# Patient Record
Sex: FEMALE | URBAN - METROPOLITAN AREA
[De-identification: names, ages, dates, MRNs, and addresses within clinical notes are randomized per-mention and may not be internally consistent; named-entity substitution may affect disease eponyms.]

---

## 2023-10-16 ENCOUNTER — APPOINTMENT (RX ONLY)
Dept: URBAN - METROPOLITAN AREA CLINIC 327 | Facility: CLINIC | Age: 43
Setting detail: DERMATOLOGY
End: 2023-10-16

## 2023-10-16 DIAGNOSIS — Z41.9 ENCOUNTER FOR PROCEDURE FOR PURPOSES OTHER THAN REMEDYING HEALTH STATE, UNSPECIFIED: ICD-10-CM

## 2023-10-16 DIAGNOSIS — H02.6 XANTHELASMA OF EYELID: ICD-10-CM

## 2023-10-16 PROBLEM — H02.64 XANTHELASMA OF LEFT UPPER EYELID: Status: ACTIVE | Noted: 2023-10-16

## 2023-10-16 PROBLEM — H02.60 XANTHELASMA OF UNSPECIFIED EYE, UNSPECIFIED EYELID: Status: ACTIVE | Noted: 2023-10-16

## 2023-10-16 PROCEDURE — ? ADDITIONAL NOTES

## 2023-10-16 PROCEDURE — ? SMARTXIDE DEKA DOT

## 2023-10-16 PROCEDURE — ? COSMETIC CONSULTATION: COOLPEEL

## 2023-10-16 ASSESSMENT — LOCATION DETAILED DESCRIPTION DERM
LOCATION DETAILED: LEFT MEDIAL SUPERIOR EYELID
LOCATION DETAILED: RIGHT MEDIAL EYEBROW

## 2023-10-16 ASSESSMENT — LOCATION ZONE DERM
LOCATION ZONE: FACE
LOCATION ZONE: EYELID

## 2023-10-16 ASSESSMENT — LOCATION SIMPLE DESCRIPTION DERM
LOCATION SIMPLE: RIGHT EYEBROW
LOCATION SIMPLE: LEFT SUPERIOR EYELID

## 2023-10-16 NOTE — PROCEDURE: SMARTXIDE DEKA DOT
Power (In Dawn): 3
Add An Additional Laser Setting?: no
Spot Shape (Optional): Hexagon
Scanning Mode: Normal
Additional Area Number Of Passes: two passes
Power (In Dawn): 1
Number Of Passes: one pass
Preprocedure Details: After the anesthetic was removed, the area was cleaned with a lipid free cleanser and then alcohol pads. Eye protection was placed and the procedure was completed at the stated parameters.
Dwell Time (In Microseconds): 200
Informed Consent: Risks, complications and alternatives were discussed at length with the patient who verbalized understanding.  Risks reviewed including but not limited to crusting, scabbing, blistering, scarring, darker or lighter pigmentary change, incomplete improvement of dyschromia, wrinkles, prolonged erythema and facial swelling, infection and bleeding.  In addition, risk of cold sores was explained as well as need for further treatment. Expectations on up to two weeks of down time were discussed at length with the patient who verbailized understanding. Explained in detail that no guarantees can be made on treatment results or down time.  Patient verbalizes understanding that this is a cosmetic procedure and that full reimbursement is expected for each treatment session.
Additional Anesthesia Type: 1% lidocaine with epinephrine
Post-Care Instructions: The patient was instructed to  use white vinegar (one part) and water (ten parts) soaks for 5 minutes, twice daily for the first 2 days. After the soaks, a thin covering of occlusive ointment is applied (Aquaphor, Vasoline or Elta Laser Balm.
Handpiece: Hi-Scan Dot
Detail Level: Detailed
Scanning Mode: SmartTrack
Price (Use Numbers Only, No Special Characters Or $): 150
Plume Evacuation: Plume Evacuator
Indication Override (Will Not Show Above If Text Entered): Xanthelasma
Spot Shape (Optional): Square
Topical Anesthesia?: 23% lidocaine, 7% tetracaine
Post-Procedure Ointment: Aquaphor